# Patient Record
Sex: FEMALE | Race: WHITE | NOT HISPANIC OR LATINO | Employment: UNEMPLOYED | URBAN - METROPOLITAN AREA
[De-identification: names, ages, dates, MRNs, and addresses within clinical notes are randomized per-mention and may not be internally consistent; named-entity substitution may affect disease eponyms.]

---

## 2023-08-11 ENCOUNTER — HOSPITAL ENCOUNTER (EMERGENCY)
Facility: MEDICAL CENTER | Age: 33
End: 2023-08-11
Attending: EMERGENCY MEDICINE
Payer: COMMERCIAL

## 2023-08-11 VITALS
WEIGHT: 146.83 LBS | TEMPERATURE: 98.4 F | OXYGEN SATURATION: 98 % | DIASTOLIC BLOOD PRESSURE: 81 MMHG | BODY MASS INDEX: 25.07 KG/M2 | HEIGHT: 64 IN | HEART RATE: 86 BPM | SYSTOLIC BLOOD PRESSURE: 128 MMHG | RESPIRATION RATE: 18 BRPM

## 2023-08-11 DIAGNOSIS — H53.9 VISUAL DISTURBANCE: ICD-10-CM

## 2023-08-11 LAB — GLUCOSE BLD STRIP.AUTO-MCNC: 92 MG/DL (ref 65–99)

## 2023-08-11 PROCEDURE — 82962 GLUCOSE BLOOD TEST: CPT

## 2023-08-11 PROCEDURE — 99283 EMERGENCY DEPT VISIT LOW MDM: CPT

## 2023-08-11 PROCEDURE — 700101 HCHG RX REV CODE 250: Performed by: EMERGENCY MEDICINE

## 2023-08-11 RX ORDER — PROPARACAINE HYDROCHLORIDE 5 MG/ML
1 SOLUTION/ DROPS OPHTHALMIC ONCE
Status: COMPLETED | OUTPATIENT
Start: 2023-08-11 | End: 2023-08-11

## 2023-08-11 RX ADMIN — PROPARACAINE HYDROCHLORIDE 1 DROP: 5 SOLUTION/ DROPS OPHTHALMIC at 19:37

## 2023-08-11 RX ADMIN — FLUORESCEIN SODIUM 2 MG: 1 STRIP OPHTHALMIC at 19:37

## 2023-08-12 NOTE — ED TRIAGE NOTES
"Patient presents to the ER with the following complaints:    Chief Complaint   Patient presents with    Blurred Vision     Patient states her vision is cloudy. Almost like \"there is smoke in the room\". Onset earlier today.        /83   Pulse 95   Temp 37 °C (98.6 °F) (Temporal)   Resp 16   Ht 1.626 m (5' 4\")   Wt 66.6 kg (146 lb 13.2 oz)   LMP 07/20/2023 (Approximate)   SpO2 96%   BMI 25.20 kg/m²       "

## 2023-08-12 NOTE — ED NOTES
ERP at bedside. Pt agrees with plan of care discussed by ERP. Varun in low position, side rail up for pt safety. Call light within reach. Continued to monitor

## 2023-08-12 NOTE — ED PROVIDER NOTES
"ED Provider Note    CHIEF COMPLAINT  Chief Complaint   Patient presents with    Blurred Vision     Patient states her vision is cloudy. Almost like \"there is smoke in the room\". Onset earlier today.        EXTERNAL RECORDS REVIEWED  Outpatient Notes none available    HPI/ROS  LIMITATION TO HISTORY   Select: : None  OUTSIDE HISTORIAN(S):  Significant other at bedside, comfortable with plan of care    Che Kendall is a 33 y.o. female who presents for evaluation of visual disturbance.  Patient relates new onset this afternoon about 4:00 after she took a shower.  Symptoms are improving but still present.  She notes in both eyes a cloudy/smoky appearance.  She relates she initially thought there was smoke in the room or potentially a humidifier on.  No particular eye trauma or eye foreign body history.  No history of head trauma.  No eye drainage, no nausea, no vomiting.  Patient wears contacts and removed these and irrigated her eyes with some improvement though symptoms not totally resolved.  She notes no floaters, no vision loss, no curtain like visual loss/disturbance.    PAST MEDICAL HISTORY   History of asthma    SURGICAL HISTORY  patient denies any surgical history    FAMILY HISTORY  No diabetes in immediate family    SOCIAL HISTORY  Social History     Tobacco Use    Smoking status: Former     Types: Cigarettes    Smokeless tobacco: Never   Substance and Sexual Activity    Alcohol use: Yes    Drug use: Yes     Types: Inhaled     Comment: Marijuanna    Sexual activity: Not on file       CURRENT MEDICATIONS  Home Medications       Reviewed by Navneet Oneill R.N. (Registered Nurse) on 08/11/23 at 1836  Med List Status: Not Addressed     Medication Last Dose Status        Patient Dillon Taking any Medications                           ALLERGIES  Not on File    PHYSICAL EXAM  VITAL SIGNS: /83   Pulse 95   Temp 37 °C (98.6 °F) (Temporal)   Resp 16   Ht 1.626 m (5' 4\")   Wt 66.6 kg (146 lb 13.2 oz) "   LMP 07/20/2023 (Approximate)   SpO2 96%   BMI 25.20 kg/m²    General: Alert, no acute distress  Skin: Warm, dry, normal for ethnicity  Head: Normocephalic, atraumatic  Neck: Trachea midline, no tenderness  Eye: PERRL, normal conjunctiva, extraocular movements intact.  Visual acuity demonstrates OD and OS and bilaterral (corrected) 20/30.  After instillation of proparacaine and fluorescein dye with eyelid eversion done there is no evidence of corneal clouding, no hyphema, no hypopyon, no dendritic lesions, no abrasions, no foreign bodies.  Intraocular pressures 13 on the right and 14 on the left.  ENMT: Oral mucosa moist  Cardiovascular: Normal peripheral perfusion  Respiratory: espirations are non-labored  Musculoskeletal: No swelling, no deformity  Neurological: Alert and oriented to person, place, time, and situation  Psychiatric: Cooperative, appropriate mood & affect       LABS  Accu check shows sbg 89.      COURSE & MEDICAL DECISION MAKING    ED Observation Status? No; Patient does not meet criteria for ED Observation.     1948: Exam is reassuring thus far, have paged ophthalmology for consultation.    INITIAL ASSESSMENT, COURSE AND PLAN  Care Narrative: This is a 33-year-old female who presents for evaluation of visual disturbance.  Describes a cloudy appearance to her vision after she took a shower, improved after irrigation and when she does get her contacts were still present so she came to be assessed.  Reassuringly she does note floaters nor any vision loss, this would not be typical for vitreous detachment/retinal detachment respectively.  Examination after instillation of fluorescein dye demonstrates no evidence of any dye uptake or foreign body, she has no corneal abrasion nor any dendritic lesions.  Neurologically she does not have any particular deficits.  Intraocular pressures are well within normal limits.  Unclear etiology of her visual disturbance, have called for ophthalmology consultation,  ophthalmologist recommends cessation of contact lens use and irrigation, she can call on Monday to be seen in the office.  Blood sugar were within normal limits, not consistent with new onset of diabetes.  HTN/IDDM FOLLOW UP:  The patient is referred to a primary physician for blood pressure management, diabetic screening, and for all other preventive health concerns      ADDITIONAL PROBLEM LIST  Cloudy vision  DISPOSITION AND DISCUSSIONS  I have discussed management of the patient with the following physicians and BRITTANY's:  Dr. Moreno recommends cessation of contact lens use and irrigation, she can call on Monday to be seen in the office.      Discussion of management with other QHP or appropriate source(s): None     Escalation of care considered, and ultimately not performed:NA    Barriers to care at this time, including but not limited to: Patient does not have established PCP and from out of town .     Decision tools and prescription drugs considered including, but not limited to:  NA .    The patient will return for new or worsening symptoms and is stable at the time of discharge.    Patient has had high blood pressure while in the emergency department, felt likely secondary to medical condition. Counseled patient to monitor blood pressure at home and follow up with primary care physician.      DISPOSITION:  Patient will be discharged home in stable condition.    FOLLOW UP:  Stephan Moreno M.D.  [address] [facility]  [place] [address]  [phone]    Schedule an appointment as soon as possible for a visit         OUTPATIENT MEDICATIONS:  New Prescriptions    DEXTRAN 70-HYPROMELLOSE 0.1-0.3 % SOLUTION    Administer 2 Drops into affected eye(s) every four hours as needed (Eye discomfort).          FINAL DIAGNOSIS  1. Visual disturbance           Electronically signed by: Kamran Chowdary M.D., 8/11/2023 7:24 PM

## 2023-08-12 NOTE — ED NOTES
Visual acuity test performed with results of right eye 20/30 corrected, left eye 20/30 corrected, and both eyes 20/30 corrected

## 2023-08-28 ENCOUNTER — TELEPHONE (OUTPATIENT)
Dept: HEALTH INFORMATION MANAGEMENT | Facility: OTHER | Age: 33
End: 2023-08-28
Payer: COMMERCIAL